# Patient Record
Sex: MALE | Race: WHITE | Employment: FULL TIME | URBAN - METROPOLITAN AREA
[De-identification: names, ages, dates, MRNs, and addresses within clinical notes are randomized per-mention and may not be internally consistent; named-entity substitution may affect disease eponyms.]

---

## 2019-12-16 ENCOUNTER — OFFICE VISIT (OUTPATIENT)
Dept: FAMILY MEDICINE CLINIC | Age: 54
End: 2019-12-16
Payer: COMMERCIAL

## 2019-12-16 VITALS
WEIGHT: 201 LBS | BODY MASS INDEX: 30.46 KG/M2 | HEIGHT: 68 IN | OXYGEN SATURATION: 97 % | SYSTOLIC BLOOD PRESSURE: 128 MMHG | HEART RATE: 85 BPM | DIASTOLIC BLOOD PRESSURE: 72 MMHG

## 2019-12-16 DIAGNOSIS — K42.9 UMBILICAL HERNIA WITHOUT OBSTRUCTION AND WITHOUT GANGRENE: ICD-10-CM

## 2019-12-16 DIAGNOSIS — Z00.00 ENCOUNTER FOR MEDICAL EXAMINATION TO ESTABLISH CARE: Primary | ICD-10-CM

## 2019-12-16 DIAGNOSIS — M62.08 DIASTASIS RECTI: ICD-10-CM

## 2019-12-16 DIAGNOSIS — Z23 NEED FOR TDAP VACCINATION: ICD-10-CM

## 2019-12-16 DIAGNOSIS — Z12.11 COLON CANCER SCREENING: ICD-10-CM

## 2019-12-16 DIAGNOSIS — Z00.00 ENCOUNTER FOR MEDICAL EXAMINATION TO ESTABLISH CARE: ICD-10-CM

## 2019-12-16 LAB
A/G RATIO: 2 (ref 1.1–2.2)
ALBUMIN SERPL-MCNC: 4.6 G/DL (ref 3.4–5)
ALP BLD-CCNC: 47 U/L (ref 40–129)
ALT SERPL-CCNC: 29 U/L (ref 10–40)
ANION GAP SERPL CALCULATED.3IONS-SCNC: 16 MMOL/L (ref 3–16)
AST SERPL-CCNC: 17 U/L (ref 15–37)
BASOPHILS ABSOLUTE: 0 K/UL (ref 0–0.2)
BASOPHILS RELATIVE PERCENT: 0.8 %
BILIRUB SERPL-MCNC: 0.5 MG/DL (ref 0–1)
BUN BLDV-MCNC: 20 MG/DL (ref 7–20)
CALCIUM SERPL-MCNC: 9.6 MG/DL (ref 8.3–10.6)
CHLORIDE BLD-SCNC: 105 MMOL/L (ref 99–110)
CHOLESTEROL, TOTAL: 257 MG/DL (ref 0–199)
CO2: 23 MMOL/L (ref 21–32)
CREAT SERPL-MCNC: 0.9 MG/DL (ref 0.9–1.3)
EOSINOPHILS ABSOLUTE: 0 K/UL (ref 0–0.6)
EOSINOPHILS RELATIVE PERCENT: 0.9 %
GFR AFRICAN AMERICAN: >60
GFR NON-AFRICAN AMERICAN: >60
GLOBULIN: 2.3 G/DL
GLUCOSE BLD-MCNC: 114 MG/DL (ref 70–99)
HCT VFR BLD CALC: 44.9 % (ref 40.5–52.5)
HDLC SERPL-MCNC: 61 MG/DL (ref 40–60)
HEMOGLOBIN: 15.5 G/DL (ref 13.5–17.5)
LDL CHOLESTEROL CALCULATED: 172 MG/DL
LYMPHOCYTES ABSOLUTE: 1.3 K/UL (ref 1–5.1)
LYMPHOCYTES RELATIVE PERCENT: 28.4 %
MCH RBC QN AUTO: 32.4 PG (ref 26–34)
MCHC RBC AUTO-ENTMCNC: 34.5 G/DL (ref 31–36)
MCV RBC AUTO: 94 FL (ref 80–100)
MONOCYTES ABSOLUTE: 0.4 K/UL (ref 0–1.3)
MONOCYTES RELATIVE PERCENT: 9.5 %
NEUTROPHILS ABSOLUTE: 2.7 K/UL (ref 1.7–7.7)
NEUTROPHILS RELATIVE PERCENT: 60.4 %
PDW BLD-RTO: 12.8 % (ref 12.4–15.4)
PLATELET # BLD: 220 K/UL (ref 135–450)
PMV BLD AUTO: 7.7 FL (ref 5–10.5)
POTASSIUM SERPL-SCNC: 4.4 MMOL/L (ref 3.5–5.1)
RBC # BLD: 4.78 M/UL (ref 4.2–5.9)
SODIUM BLD-SCNC: 144 MMOL/L (ref 136–145)
TOTAL PROTEIN: 6.9 G/DL (ref 6.4–8.2)
TRIGL SERPL-MCNC: 121 MG/DL (ref 0–150)
VLDLC SERPL CALC-MCNC: 24 MG/DL
WBC # BLD: 4.4 K/UL (ref 4–11)

## 2019-12-16 PROCEDURE — 90471 IMMUNIZATION ADMIN: CPT | Performed by: FAMILY MEDICINE

## 2019-12-16 PROCEDURE — 90715 TDAP VACCINE 7 YRS/> IM: CPT | Performed by: FAMILY MEDICINE

## 2019-12-16 PROCEDURE — 99386 PREV VISIT NEW AGE 40-64: CPT | Performed by: FAMILY MEDICINE

## 2019-12-16 SDOH — SOCIAL STABILITY: SOCIAL NETWORK
IN A TYPICAL WEEK, HOW MANY TIMES DO YOU TALK ON THE PHONE WITH FAMILY, FRIENDS, OR NEIGHBORS?: MORE THAN THREE TIMES A WEEK

## 2019-12-16 SDOH — SOCIAL STABILITY: SOCIAL NETWORK
DO YOU BELONG TO ANY CLUBS OR ORGANIZATIONS SUCH AS CHURCH GROUPS UNIONS, FRATERNAL OR ATHLETIC GROUPS, OR SCHOOL GROUPS?: NO

## 2019-12-16 SDOH — SOCIAL STABILITY: SOCIAL INSECURITY: WITHIN THE LAST YEAR, HAVE YOU BEEN AFRAID OF YOUR PARTNER OR EX-PARTNER?: NO

## 2019-12-16 SDOH — SOCIAL STABILITY: SOCIAL INSECURITY
WITHIN THE LAST YEAR, HAVE YOU BEEN KICKED, HIT, SLAPPED, OR OTHERWISE PHYSICALLY HURT BY YOUR PARTNER OR EX-PARTNER?: NO

## 2019-12-16 SDOH — HEALTH STABILITY: PHYSICAL HEALTH: ON AVERAGE, HOW MANY DAYS PER WEEK DO YOU ENGAGE IN MODERATE TO STRENUOUS EXERCISE (LIKE A BRISK WALK)?: 4 DAYS

## 2019-12-16 SDOH — SOCIAL STABILITY: SOCIAL NETWORK: HOW OFTEN DO YOU ATTEND CHURCH OR RELIGIOUS SERVICES?: NEVER

## 2019-12-16 SDOH — SOCIAL STABILITY: SOCIAL NETWORK: ARE YOU MARRIED, WIDOWED, DIVORCED, SEPARATED, NEVER MARRIED, OR LIVING WITH A PARTNER?: NEVER MARRIED

## 2019-12-16 SDOH — SOCIAL STABILITY: SOCIAL NETWORK: HOW OFTEN DO YOU ATTENT MEETINGS OF THE CLUB OR ORGANIZATION YOU BELONG TO?: NEVER

## 2019-12-16 SDOH — SOCIAL STABILITY: SOCIAL NETWORK: HOW OFTEN DO YOU GET TOGETHER WITH FRIENDS OR RELATIVES?: ONCE A WEEK

## 2019-12-16 SDOH — SOCIAL STABILITY: SOCIAL INSECURITY: WITHIN THE LAST YEAR, HAVE YOU BEEN HUMILIATED OR EMOTIONALLY ABUSED IN OTHER WAYS BY YOUR PARTNER OR EX-PARTNER?: NO

## 2019-12-16 SDOH — HEALTH STABILITY: MENTAL HEALTH
STRESS IS WHEN SOMEONE FEELS TENSE, NERVOUS, ANXIOUS, OR CAN'T SLEEP AT NIGHT BECAUSE THEIR MIND IS TROUBLED. HOW STRESSED ARE YOU?: NOT AT ALL

## 2019-12-16 SDOH — SOCIAL STABILITY: SOCIAL INSECURITY
WITHIN THE LAST YEAR, HAVE TO BEEN RAPED OR FORCED TO HAVE ANY KIND OF SEXUAL ACTIVITY BY YOUR PARTNER OR EX-PARTNER?: NO

## 2019-12-16 SDOH — HEALTH STABILITY: PHYSICAL HEALTH: ON AVERAGE, HOW MANY MINUTES DO YOU ENGAGE IN EXERCISE AT THIS LEVEL?: 60 MIN

## 2019-12-16 SDOH — HEALTH STABILITY: MENTAL HEALTH: HOW OFTEN DO YOU HAVE A DRINK CONTAINING ALCOHOL?: MONTHLY OR LESS

## 2019-12-16 SDOH — HEALTH STABILITY: MENTAL HEALTH: HOW MANY STANDARD DRINKS CONTAINING ALCOHOL DO YOU HAVE ON A TYPICAL DAY?: 5 OR 6

## 2019-12-16 ASSESSMENT — ENCOUNTER SYMPTOMS
CONSTIPATION: 0
COLOR CHANGE: 0
ABDOMINAL PAIN: 0
CHEST TIGHTNESS: 0
SORE THROAT: 0
ANAL BLEEDING: 0
SINUS PAIN: 0
DIARRHEA: 0
NAUSEA: 0
ABDOMINAL DISTENTION: 0
RECTAL PAIN: 0
PHOTOPHOBIA: 0
TROUBLE SWALLOWING: 0
SINUS PRESSURE: 0
RHINORRHEA: 0
SHORTNESS OF BREATH: 0
BACK PAIN: 0
BLOOD IN STOOL: 0
WHEEZING: 0
VOMITING: 0
COUGH: 0

## 2019-12-16 ASSESSMENT — PATIENT HEALTH QUESTIONNAIRE - PHQ9
SUM OF ALL RESPONSES TO PHQ QUESTIONS 1-9: 0
SUM OF ALL RESPONSES TO PHQ9 QUESTIONS 1 & 2: 0
SUM OF ALL RESPONSES TO PHQ QUESTIONS 1-9: 0
1. LITTLE INTEREST OR PLEASURE IN DOING THINGS: 0
2. FEELING DOWN, DEPRESSED OR HOPELESS: 0

## 2019-12-27 ENCOUNTER — OFFICE VISIT (OUTPATIENT)
Dept: SURGERY | Age: 54
End: 2019-12-27
Payer: COMMERCIAL

## 2019-12-27 ENCOUNTER — TELEPHONE (OUTPATIENT)
Dept: FAMILY MEDICINE CLINIC | Age: 54
End: 2019-12-27

## 2019-12-27 VITALS
BODY MASS INDEX: 30.46 KG/M2 | DIASTOLIC BLOOD PRESSURE: 83 MMHG | TEMPERATURE: 97.7 F | HEIGHT: 68 IN | SYSTOLIC BLOOD PRESSURE: 130 MMHG | WEIGHT: 201 LBS

## 2019-12-27 DIAGNOSIS — K42.9 UMBILICAL HERNIA WITHOUT OBSTRUCTION AND WITHOUT GANGRENE: Primary | ICD-10-CM

## 2019-12-27 PROCEDURE — 99203 OFFICE O/P NEW LOW 30 MIN: CPT | Performed by: SURGERY

## 2019-12-30 ENCOUNTER — TELEPHONE (OUTPATIENT)
Dept: SURGERY | Age: 54
End: 2019-12-30

## 2020-01-10 ENCOUNTER — OFFICE VISIT (OUTPATIENT)
Dept: FAMILY MEDICINE CLINIC | Age: 55
End: 2020-01-10
Payer: COMMERCIAL

## 2020-01-10 VITALS
OXYGEN SATURATION: 96 % | WEIGHT: 197 LBS | HEART RATE: 82 BPM | DIASTOLIC BLOOD PRESSURE: 72 MMHG | SYSTOLIC BLOOD PRESSURE: 113 MMHG | BODY MASS INDEX: 30.4 KG/M2

## 2020-01-10 DIAGNOSIS — R73.01 ELEVATED FASTING GLUCOSE: ICD-10-CM

## 2020-01-10 DIAGNOSIS — Z11.59 NEED FOR HEPATITIS C SCREENING TEST: ICD-10-CM

## 2020-01-10 DIAGNOSIS — Z11.3 ROUTINE SCREENING FOR STI (SEXUALLY TRANSMITTED INFECTION): ICD-10-CM

## 2020-01-10 LAB — HEPATITIS C ANTIBODY INTERPRETATION: NORMAL

## 2020-01-10 PROCEDURE — 99214 OFFICE O/P EST MOD 30 MIN: CPT | Performed by: FAMILY MEDICINE

## 2020-01-10 ASSESSMENT — PATIENT HEALTH QUESTIONNAIRE - PHQ9
2. FEELING DOWN, DEPRESSED OR HOPELESS: 0
SUM OF ALL RESPONSES TO PHQ QUESTIONS 1-9: 0
SUM OF ALL RESPONSES TO PHQ QUESTIONS 1-9: 0
1. LITTLE INTEREST OR PLEASURE IN DOING THINGS: 0
SUM OF ALL RESPONSES TO PHQ9 QUESTIONS 1 & 2: 0

## 2020-01-10 NOTE — PROGRESS NOTES
1 [facility]  Preoperative Examination        Deb Golden  YOB: 1965    Date of Service:  1/10/2020    Vitals:    01/10/20 1113   BP: 113/72   Site: Left Upper Arm   Position: Sitting   Pulse: 82   SpO2: 96%   Weight: 197 lb (89.4 kg)      Wt Readings from Last 2 Encounters:   01/10/20 197 lb (89.4 kg)   12/27/19 201 lb (91.2 kg)     BP Readings from Last 3 Encounters:   01/10/20 113/72   12/27/19 130/83   12/16/19 128/72        Chief Complaint   Patient presents with    Pre-op Exam     Hernia Repair 1/21 Dr. Garrett Schmitz     No Known Allergies  No outpatient medications have been marked as taking for the 1/10/20 encounter (Office Visit) with Cary Silva MD.       This patient presents to the office today for a preoperative consultation at the request of surgeon, Rachel Mosher MD, who plans on performing open umbilical hernia repair on January 21. The current problem began a few years ago, and symptoms have been unchanged with time. Conservative therapy: N/A. Planned anesthesia is  MAC. The patient has the following known anesthesia issues: none -- one prior surgery and tolerated well in the past.  Patient has a bleeding risk of : no recent abnormal bleeding, no remote history of abnormal bleeding, no use of Ca-channel blockers. He had mildly elevated fasting glucose in recent labs -- asymptomatic -- will check A1c    Recently broke-up with his girlfriend.   Requesting STI screening -- he is asymptomatic       Patient Active Problem List   Diagnosis    Diastasis recti    Umbilical hernia    Mixed hyperlipidemia       Past Medical History:   Diagnosis Date    Diastasis recti     Mixed hyperlipidemia     Umbilical hernia      Past Surgical History:   Procedure Laterality Date    INGUINAL HERNIA REPAIR Right      Family History   Problem Relation Age of Onset    Diabetes Mother     Breast Cancer Mother      Social History     Socioeconomic History    Marital status: glucose -- A1c today      Lucrecia Archuleta, [address] [facility]  1/10/2020

## 2020-01-11 LAB
ESTIMATED AVERAGE GLUCOSE: 108.3 MG/DL
HBA1C MFR BLD: 5.4 %
HIV AG/AB: NORMAL
HIV ANTIGEN: NORMAL
HIV-1 ANTIBODY: NORMAL
HIV-2 AB: NORMAL

## 2020-01-13 LAB
C. TRACHOMATIS DNA ,URINE: NEGATIVE
N. GONORRHOEAE DNA, URINE: NEGATIVE

## 2020-01-13 NOTE — PROGRESS NOTES
[facility] [employer] / [facility] [place] ([facility]) Manohar Arteaga, [address]    Acknowledgment of Informed Consent for Surgical or Medical Procedure and Sedation  I agree to allow doctor(s) IKANA CASILLAS and his/her associates or assistants, including residents and/or other qualified medical practitioner to perform the following medical treatment or procedure and to administer or direct the administration of sedation as necessary:  Procedure(s): OPEN [address]  My doctor has explained the following regarding the proposed procedure:   the explanation of the procedure   the benefits of the procedure   the potential problems that might occur during recuperation   the risks and side effects of the procedure which could include but are not limited to severe blood loss, infection, stroke or death   the benefits, risks and side effect of alternative procedures including the consequences of declining this procedure or any alternative procedures   the likelihood of achieving satisfactory results. I acknowledge no guarantee or assurance has been made to me regarding the results. I understand that during the course of this treatment/procedure, unforeseen conditions can occur which require an additional or different procedure. I agree to allow my physician or assistants to perform such extension of the original procedure as they may find necessary. I understand that sedation will often result in temporary impairment of memory and fine motor skills and that sedation can occasionally progress to a state of deep sedation or general anesthesia. I understand the risks of anesthesia for surgery include, but are not limited to, sore throat, hoarseness, injury to face, mouth, or teeth; nausea; headache; injury to blood vessels or nerves; death, brain damage, or paralysis.     I understand that if I have a Limitation of Treatment order in effect during my hospitalization, the order may or may not be

## 2020-01-16 NOTE — PROGRESS NOTES

## 2020-01-20 ENCOUNTER — ANESTHESIA EVENT (OUTPATIENT)
Dept: OPERATING ROOM | Age: 55
End: 2020-01-20
Payer: COMMERCIAL

## 2020-01-21 ENCOUNTER — HOSPITAL ENCOUNTER (OUTPATIENT)
Age: 55
Setting detail: OUTPATIENT SURGERY
Discharge: HOME | End: 2020-01-21
Attending: SURGERY | Admitting: SURGERY
Payer: COMMERCIAL

## 2020-01-21 ENCOUNTER — ANESTHESIA (OUTPATIENT)
Dept: OPERATING ROOM | Age: 55
End: 2020-01-21
Payer: COMMERCIAL

## 2020-01-21 VITALS
HEART RATE: 64 BPM | RESPIRATION RATE: 16 BRPM | TEMPERATURE: 97.1 F | DIASTOLIC BLOOD PRESSURE: 88 MMHG | SYSTOLIC BLOOD PRESSURE: 125 MMHG | BODY MASS INDEX: 29.55 KG/M2 | WEIGHT: 195 LBS | OXYGEN SATURATION: 97 % | HEIGHT: 68 IN

## 2020-01-21 VITALS — OXYGEN SATURATION: 95 % | SYSTOLIC BLOOD PRESSURE: 91 MMHG | DIASTOLIC BLOOD PRESSURE: 56 MMHG

## 2020-01-21 PROCEDURE — 7100000011 HC PHASE II RECOVERY - ADDTL 15 MIN: Performed by: SURGERY

## 2020-01-21 PROCEDURE — 6360000002 HC RX W HCPCS: Performed by: NURSE ANESTHETIST, CERTIFIED REGISTERED

## 2020-01-21 PROCEDURE — 7100000001 HC PACU RECOVERY - ADDTL 15 MIN: Performed by: SURGERY

## 2020-01-21 PROCEDURE — L0450 TLSO FLEX TRUNK/THOR PRE OTS: HCPCS | Performed by: SURGERY

## 2020-01-21 PROCEDURE — 2500000003 HC RX 250 WO HCPCS: Performed by: SURGERY

## 2020-01-21 PROCEDURE — 3700000000 HC ANESTHESIA ATTENDED CARE: Performed by: SURGERY

## 2020-01-21 PROCEDURE — 2709999900 HC NON-CHARGEABLE SUPPLY: Performed by: SURGERY

## 2020-01-21 PROCEDURE — 6370000000 HC RX 637 (ALT 250 FOR IP): Performed by: ANESTHESIOLOGY

## 2020-01-21 PROCEDURE — 3600000004 HC SURGERY LEVEL 4 BASE: Performed by: SURGERY

## 2020-01-21 PROCEDURE — 6360000002 HC RX W HCPCS: Performed by: SURGERY

## 2020-01-21 PROCEDURE — 2580000003 HC RX 258: Performed by: ANESTHESIOLOGY

## 2020-01-21 PROCEDURE — 49585 REPAIR UMBILICAL HERN,5+Y/O,REDUC: CPT | Performed by: SURGERY

## 2020-01-21 PROCEDURE — 3700000001 HC ADD 15 MINUTES (ANESTHESIA): Performed by: SURGERY

## 2020-01-21 PROCEDURE — 7100000000 HC PACU RECOVERY - FIRST 15 MIN: Performed by: SURGERY

## 2020-01-21 PROCEDURE — 3600000014 HC SURGERY LEVEL 4 ADDTL 15MIN: Performed by: SURGERY

## 2020-01-21 PROCEDURE — 7100000010 HC PHASE II RECOVERY - FIRST 15 MIN: Performed by: SURGERY

## 2020-01-21 PROCEDURE — 88302 TISSUE EXAM BY PATHOLOGIST: CPT

## 2020-01-21 PROCEDURE — 2580000003 HC RX 258: Performed by: SURGERY

## 2020-01-21 RX ORDER — MEPERIDINE HYDROCHLORIDE 25 MG/ML
12.5 INJECTION INTRAMUSCULAR; INTRAVENOUS; SUBCUTANEOUS EVERY 5 MIN PRN
Status: DISCONTINUED | OUTPATIENT
Start: 2020-01-21 | End: 2020-01-21 | Stop reason: HOSPADM

## 2020-01-21 RX ORDER — LIDOCAINE HYDROCHLORIDE 20 MG/ML
INJECTION, SOLUTION INTRAVENOUS PRN
Status: DISCONTINUED | OUTPATIENT
Start: 2020-01-21 | End: 2020-01-21 | Stop reason: SDUPTHER

## 2020-01-21 RX ORDER — PROPOFOL 10 MG/ML
INJECTION, EMULSION INTRAVENOUS CONTINUOUS PRN
Status: DISCONTINUED | OUTPATIENT
Start: 2020-01-21 | End: 2020-01-21 | Stop reason: SDUPTHER

## 2020-01-21 RX ORDER — HYDRALAZINE HYDROCHLORIDE 20 MG/ML
5 INJECTION INTRAMUSCULAR; INTRAVENOUS EVERY 10 MIN PRN
Status: DISCONTINUED | OUTPATIENT
Start: 2020-01-21 | End: 2020-01-21 | Stop reason: HOSPADM

## 2020-01-21 RX ORDER — 0.9 % SODIUM CHLORIDE 0.9 %
500 INTRAVENOUS SOLUTION INTRAVENOUS
Status: DISCONTINUED | OUTPATIENT
Start: 2020-01-21 | End: 2020-01-21 | Stop reason: HOSPADM

## 2020-01-21 RX ORDER — PROCHLORPERAZINE EDISYLATE 5 MG/ML
5 INJECTION INTRAMUSCULAR; INTRAVENOUS
Status: DISCONTINUED | OUTPATIENT
Start: 2020-01-21 | End: 2020-01-21 | Stop reason: HOSPADM

## 2020-01-21 RX ORDER — HYDROCODONE BITARTRATE AND ACETAMINOPHEN 5; 325 MG/1; MG/1
1 TABLET ORAL
Status: COMPLETED | OUTPATIENT
Start: 2020-01-21 | End: 2020-01-21

## 2020-01-21 RX ORDER — FENTANYL CITRATE 50 UG/ML
INJECTION, SOLUTION INTRAMUSCULAR; INTRAVENOUS PRN
Status: DISCONTINUED | OUTPATIENT
Start: 2020-01-21 | End: 2020-01-21 | Stop reason: SDUPTHER

## 2020-01-21 RX ORDER — SODIUM CHLORIDE, SODIUM LACTATE, POTASSIUM CHLORIDE, CALCIUM CHLORIDE 600; 310; 30; 20 MG/100ML; MG/100ML; MG/100ML; MG/100ML
INJECTION, SOLUTION INTRAVENOUS CONTINUOUS
Status: DISCONTINUED | OUTPATIENT
Start: 2020-01-21 | End: 2020-01-21 | Stop reason: HOSPADM

## 2020-01-21 RX ORDER — BUPIVACAINE HYDROCHLORIDE 5 MG/ML
INJECTION, SOLUTION EPIDURAL; INTRACAUDAL PRN
Status: DISCONTINUED | OUTPATIENT
Start: 2020-01-21 | End: 2020-01-21 | Stop reason: ALTCHOICE

## 2020-01-21 RX ORDER — MAGNESIUM HYDROXIDE 1200 MG/15ML
LIQUID ORAL CONTINUOUS PRN
Status: COMPLETED | OUTPATIENT
Start: 2020-01-21 | End: 2020-01-21

## 2020-01-21 RX ORDER — ONDANSETRON 2 MG/ML
4 INJECTION INTRAMUSCULAR; INTRAVENOUS
Status: DISCONTINUED | OUTPATIENT
Start: 2020-01-21 | End: 2020-01-21 | Stop reason: HOSPADM

## 2020-01-21 RX ORDER — DIPHENHYDRAMINE HYDROCHLORIDE 50 MG/ML
12.5 INJECTION INTRAMUSCULAR; INTRAVENOUS
Status: DISCONTINUED | OUTPATIENT
Start: 2020-01-21 | End: 2020-01-21 | Stop reason: HOSPADM

## 2020-01-21 RX ORDER — MIDAZOLAM HYDROCHLORIDE 1 MG/ML
INJECTION INTRAMUSCULAR; INTRAVENOUS PRN
Status: DISCONTINUED | OUTPATIENT
Start: 2020-01-21 | End: 2020-01-21 | Stop reason: SDUPTHER

## 2020-01-21 RX ORDER — PROPOFOL 10 MG/ML
INJECTION, EMULSION INTRAVENOUS PRN
Status: DISCONTINUED | OUTPATIENT
Start: 2020-01-21 | End: 2020-01-21 | Stop reason: SDUPTHER

## 2020-01-21 RX ADMIN — PROPOFOL 100 MG: 10 INJECTION, EMULSION INTRAVENOUS at 07:31

## 2020-01-21 RX ADMIN — LIDOCAINE HYDROCHLORIDE 100 MG: 20 INJECTION, SOLUTION INTRAVENOUS at 07:31

## 2020-01-21 RX ADMIN — PROPOFOL 20 MG: 10 INJECTION, EMULSION INTRAVENOUS at 07:49

## 2020-01-21 RX ADMIN — MIDAZOLAM HYDROCHLORIDE 2 MG: 2 INJECTION, SOLUTION INTRAMUSCULAR; INTRAVENOUS at 07:23

## 2020-01-21 RX ADMIN — SODIUM CHLORIDE, SODIUM LACTATE, POTASSIUM CHLORIDE, AND CALCIUM CHLORIDE: 600; 310; 30; 20 INJECTION, SOLUTION INTRAVENOUS at 06:51

## 2020-01-21 RX ADMIN — SODIUM CHLORIDE, SODIUM LACTATE, POTASSIUM CHLORIDE, AND CALCIUM CHLORIDE: 600; 310; 30; 20 INJECTION, SOLUTION INTRAVENOUS at 05:55

## 2020-01-21 RX ADMIN — PROPOFOL 80 MCG/KG/MIN: 10 INJECTION, EMULSION INTRAVENOUS at 07:32

## 2020-01-21 RX ADMIN — FENTANYL CITRATE 50 MCG: 50 INJECTION INTRAMUSCULAR; INTRAVENOUS at 07:31

## 2020-01-21 RX ADMIN — FENTANYL CITRATE 50 MCG: 50 INJECTION INTRAMUSCULAR; INTRAVENOUS at 07:27

## 2020-01-21 RX ADMIN — HYDROCODONE BITARTRATE AND ACETAMINOPHEN 1 TABLET: 5; 325 TABLET ORAL at 09:26

## 2020-01-21 RX ADMIN — CEFAZOLIN 2 G: 10 INJECTION, POWDER, FOR SOLUTION INTRAVENOUS at 07:28

## 2020-01-21 ASSESSMENT — PULMONARY FUNCTION TESTS
PIF_VALUE: 1
PIF_VALUE: 0
PIF_VALUE: 1
PIF_VALUE: 0
PIF_VALUE: 1
PIF_VALUE: 0
PIF_VALUE: 1
PIF_VALUE: 0
PIF_VALUE: 1
PIF_VALUE: 0
PIF_VALUE: 1

## 2020-01-21 ASSESSMENT — PAIN SCALES - GENERAL
PAINLEVEL_OUTOF10: 0
PAINLEVEL_OUTOF10: 0
PAINLEVEL_OUTOF10: 4
PAINLEVEL_OUTOF10: 0

## 2020-01-21 ASSESSMENT — PAIN DESCRIPTION - DESCRIPTORS: DESCRIPTORS: SORE

## 2020-01-21 ASSESSMENT — PAIN - FUNCTIONAL ASSESSMENT: PAIN_FUNCTIONAL_ASSESSMENT: 0-10

## 2020-01-21 ASSESSMENT — LIFESTYLE VARIABLES: SMOKING_STATUS: 0

## 2020-01-21 ASSESSMENT — PAIN DESCRIPTION - PAIN TYPE: TYPE: SURGICAL PAIN

## 2020-01-21 ASSESSMENT — PAIN DESCRIPTION - LOCATION: LOCATION: ABDOMEN

## 2020-01-21 NOTE — PROGRESS NOTES
Ambulatory Surgery/Procedure Discharge Note    Vitals:    01/21/20 0859   BP: 125/88   Pulse: 64   Resp: 16   Temp: 97.1 °F (36.2 °C)   SpO2: 97%       In: 620 [P.O.:120; I.V.:500]  Out: 300 [Urine:300]    Restroom use offered before discharge. Void 300cc's per urinal    Pain assessment:  Umbilical incision clean and dry with dermabond in place with no redness or drainage, abdominal binder in place. Pain Level: 4        Patient discharged to home/self care.  Patient discharged via wheel chair by transporter to waiting family/S.O.       1/21/2020 9:53 AM

## 2020-01-21 NOTE — PROGRESS NOTES
Pt arrived form OR, s/p OPEN UMBILICAL HERNIA REPAIR, report received form CRNA and Dr. Bettye Aguila, pt arrived awake and alert, denies pain.   Hob elevated, ice chips given

## 2020-01-21 NOTE — ANESTHESIA PRE PROCEDURE
Department of Anesthesiology  Preprocedure Note       Name:  Yolande Miller   Age:  53 y.o.  :  1965                                          MRN:  [id number]         Date:  2020      Surgeon: Felecia Lopez):  Yessica Forbes MD    Procedure: OPEN UMBILICAL HERNIA REPAIR (N/A )    Medications prior to admission:   Prior to Admission medications    Not on File       Current medications:    Current Facility-Administered Medications   Medication Dose Route Frequency Provider Last Rate Last Dose    ceFAZolin (ANCEF) 2 g in dextrose 5 % 50 mL IVPB  2 g Intravenous Once Yessica Forbes MD        lactated ringers infusion   Intravenous Continuous Vermillion Raw,  mL/hr at 20 2997         Allergies:     Allergies   Allergen Reactions    Percocet [Oxycodone-Acetaminophen] Nausea Only       Problem List:    Patient Active Problem List   Diagnosis Code    Diastasis recti I25.96    Umbilical hernia O89.7    Mixed hyperlipidemia E78.2       Past Medical History:        Diagnosis Date    Diastasis recti     Mixed hyperlipidemia     Umbilical hernia        Past Surgical History:        Procedure Laterality Date    COLONOSCOPY      INGUINAL HERNIA REPAIR Right     TONSILLECTOMY         Social History:    Social History     Tobacco Use    Smoking status: Former Smoker     Last attempt to quit: 2010     Years since quitting: 10.0    Smokeless tobacco: Never Used   Substance Use Topics    Alcohol use: Yes     Frequency: Monthly or less     Drinks per session: 5 or 6     Binge frequency: Never     Comment: occasional                                Counseling given: Not Answered      Vital Signs (Current):   Vitals:    20 0534   BP: (!) 137/95   Pulse: 78   Resp: 18   Temp: 98 °F (36.7 °C)   TempSrc: Oral   SpO2: 98%   Weight: 195 lb (88.5 kg)   Height: 5' 7.5\" (1.715 m)                                              BP Readings from Last 3 Encounters:   20 (!) 137/95   01/10/20 113/72   12/27/19 130/83       NPO Status: Time of last liquid consumption: 2100                        Time of last solid consumption: 2100                        Date of last liquid consumption: 01/20/20                        Date of last solid food consumption: 01/20/20    BMI:   Wt Readings from Last 3 Encounters:   01/21/20 195 lb (88.5 kg)   01/10/20 197 lb (89.4 kg)   12/27/19 201 lb (91.2 kg)     Body mass index is 30.09 kg/m². CBC:   Lab Results   Component Value Date    WBC 4.4 12/16/2019    RBC 4.78 12/16/2019    HGB 15.5 12/16/2019    HCT 44.9 12/16/2019    MCV 94.0 12/16/2019    RDW 12.8 12/16/2019     12/16/2019       CMP:   Lab Results   Component Value Date     12/16/2019    K 4.4 12/16/2019     12/16/2019    CO2 23 12/16/2019    BUN 20 12/16/2019    CREATININE 0.9 12/16/2019    GFRAA >60 12/16/2019    AGRATIO 2.0 12/16/2019    LABGLOM >60 12/16/2019    GLUCOSE 114 12/16/2019    PROT 6.9 12/16/2019    CALCIUM 9.6 12/16/2019    BILITOT 0.5 12/16/2019    ALKPHOS 47 12/16/2019    AST 17 12/16/2019    ALT 29 12/16/2019       POC Tests: No results for input(s): POCGLU, POCNA, POCK, POCCL, POCBUN, POCHEMO, POCHCT in the last 72 hours.     Coags: No results found for: PROTIME, INR, APTT    HCG (If Applicable): No results found for: PREGTESTUR, PREGSERUM, HCG, HCGQUANT     ABGs: No results found for: PHART, PO2ART, ZQF4VET, WCK7CJL, BEART, T5UWSNOD     Type & Screen (If Applicable):  No results found for: LABABO, [address]    Anesthesia Evaluation  Patient summary reviewed and Nursing notes reviewed no history of anesthetic complications:   Airway: Mallampati: II  TM distance: >3 FB   Neck ROM: full  Mouth opening: > = 3 FB Dental: normal exam         Pulmonary: breath sounds clear to auscultation  (+) sleep apnea (probable):      (-) not a current smoker (quit 2010)                           Cardiovascular:  Exercise tolerance: good (>4 METS),       (-) past MI    NYHA Classification:

## 2020-01-31 ENCOUNTER — OFFICE VISIT (OUTPATIENT)
Dept: SURGERY | Age: 55
End: 2020-01-31

## 2020-01-31 VITALS
SYSTOLIC BLOOD PRESSURE: 154 MMHG | TEMPERATURE: 97.8 F | BODY MASS INDEX: 30.46 KG/M2 | WEIGHT: 201 LBS | DIASTOLIC BLOOD PRESSURE: 88 MMHG | HEIGHT: 68 IN

## 2020-01-31 PROCEDURE — 99024 POSTOP FOLLOW-UP VISIT: CPT | Performed by: SURGERY

## 2020-01-31 NOTE — PROGRESS NOTES
Department of General Surgery - Adult  General Surgery Service  Resident Clinic Note      CHIEF COMPLAINT:  Post-op check    History Obtained From:  patient    HISTORY OF PRESENT ILLNESS:    The patient is a 53 y.o. male with no significant past medical history who presents for follow up without complaints. Past Medical History:   Diagnosis Date    Diastasis recti     Mixed hyperlipidemia     Umbilical hernia      Past Surgical History:   Procedure Laterality Date    COLONOSCOPY      INGUINAL HERNIA REPAIR Right     TONSILLECTOMY      UMBILICAL HERNIA REPAIR N/A 3/98/1101    OPEN UMBILICAL HERNIA REPAIR performed by Tamy Parekh MD at [address]     No current outpatient medications on file. No current facility-administered medications for this visit. Allergies   Allergen Reactions    Percocet [Oxycodone-Acetaminophen] Nausea Only     Social History     Tobacco Use    Smoking status: Former Smoker     Last attempt to quit: 2010     Years since quitting: 10.0    Smokeless tobacco: Never Used   Substance Use Topics    Alcohol use: Yes     Frequency: Monthly or less     Drinks per session: 5 or 6     Binge frequency: Never     Comment: occasional    Drug use: Not Currently     Family History   Problem Relation Age of Onset    Diabetes Mother     Breast Cancer Mother        REVIEW OF SYSTEMS:    Review of systems was negative except what was mentioned in the HPI. PHYSICAL EXAM:    There were no vitals taken for this visit.   CONSTITUTIONAL:  awake, alert, cooperative, no apparent distress, and appears stated age  LUNGS:  No increased work of breathing, good air exchange, clear to auscultation bilaterally, no crackles or wheezing  CARDIOVASCULAR:  Normal apical impulse, regular rate and rhythm, normal S1 and S2, no S3 or S4, and no murmur noted  ABDOMEN:  Incision is healing well, normal bowel sounds, soft, non-distended, non-tender, no masses palpated  SKIN:  normal skin color, texture, endy    ASSESSMENT AND PLAN:  Patient is s/p primary umbilical hernia repair. He is recovering well from surgery. His pain is minimal and he is returning to normal activities. We discussed his continued restrictions and I will see him back in the office on a prn basis.     Jonathan Ayala MD  1/31/2020 12:44 PM

## 2020-06-21 ENCOUNTER — HOSPITAL ENCOUNTER (EMERGENCY)
Age: 55
Discharge: HOME | End: 2020-06-21
Attending: EMERGENCY MEDICINE
Payer: COMMERCIAL

## 2020-06-21 VITALS
RESPIRATION RATE: 21 BRPM | DIASTOLIC BLOOD PRESSURE: 91 MMHG | OXYGEN SATURATION: 97 % | SYSTOLIC BLOOD PRESSURE: 133 MMHG | HEART RATE: 97 BPM | TEMPERATURE: 98.9 F

## 2020-06-21 LAB
AMORPHOUS: ABNORMAL /HPF
ANION GAP SERPL CALCULATED.3IONS-SCNC: 14 MMOL/L (ref 3–16)
BACTERIA: ABNORMAL /HPF
BASOPHILS ABSOLUTE: 0 K/UL (ref 0–0.2)
BASOPHILS RELATIVE PERCENT: 0.4 %
BILIRUBIN URINE: NEGATIVE
BLOOD, URINE: ABNORMAL
BUN BLDV-MCNC: 12 MG/DL (ref 7–20)
CALCIUM SERPL-MCNC: 9.6 MG/DL (ref 8.3–10.6)
CHLORIDE BLD-SCNC: 104 MMOL/L (ref 99–110)
CLARITY: ABNORMAL
CO2: 21 MMOL/L (ref 21–32)
COLOR: YELLOW
CREAT SERPL-MCNC: 0.9 MG/DL (ref 0.9–1.3)
EOSINOPHILS ABSOLUTE: 0.1 K/UL (ref 0–0.6)
EOSINOPHILS RELATIVE PERCENT: 0.6 %
GFR AFRICAN AMERICAN: >60
GFR NON-AFRICAN AMERICAN: >60
GLUCOSE BLD-MCNC: 119 MG/DL (ref 70–99)
GLUCOSE URINE: NEGATIVE MG/DL
HCT VFR BLD CALC: 42.2 % (ref 40.5–52.5)
HEMOGLOBIN: 15 G/DL (ref 13.5–17.5)
KETONES, URINE: NEGATIVE MG/DL
LEUKOCYTE ESTERASE, URINE: ABNORMAL
LYMPHOCYTES ABSOLUTE: 1.5 K/UL (ref 1–5.1)
LYMPHOCYTES RELATIVE PERCENT: 18.8 %
MCH RBC QN AUTO: 33.2 PG (ref 26–34)
MCHC RBC AUTO-ENTMCNC: 35.5 G/DL (ref 31–36)
MCV RBC AUTO: 93.5 FL (ref 80–100)
MICROSCOPIC EXAMINATION: YES
MONOCYTES ABSOLUTE: 0.8 K/UL (ref 0–1.3)
MONOCYTES RELATIVE PERCENT: 10.4 %
NEUTROPHILS ABSOLUTE: 5.5 K/UL (ref 1.7–7.7)
NEUTROPHILS RELATIVE PERCENT: 69.8 %
NITRITE, URINE: POSITIVE
PDW BLD-RTO: 12.7 % (ref 12.4–15.4)
PH UA: 7 (ref 5–8)
PLATELET # BLD: 236 K/UL (ref 135–450)
PMV BLD AUTO: 7 FL (ref 5–10.5)
POTASSIUM REFLEX MAGNESIUM: 4.2 MMOL/L (ref 3.5–5.1)
PROTEIN UA: 100 MG/DL
RBC # BLD: 4.52 M/UL (ref 4.2–5.9)
RBC UA: ABNORMAL /HPF (ref 0–4)
SODIUM BLD-SCNC: 139 MMOL/L (ref 136–145)
SPECIFIC GRAVITY UA: 1.01 (ref 1–1.03)
URINE REFLEX TO CULTURE: YES
URINE TYPE: ABNORMAL
UROBILINOGEN, URINE: 0.2 E.U./DL
WBC # BLD: 7.8 K/UL (ref 4–11)
WBC UA: >100 /HPF (ref 0–5)

## 2020-06-21 PROCEDURE — 99283 EMERGENCY DEPT VISIT LOW MDM: CPT

## 2020-06-21 PROCEDURE — 6360000002 HC RX W HCPCS: Performed by: EMERGENCY MEDICINE

## 2020-06-21 PROCEDURE — 87077 CULTURE AEROBIC IDENTIFY: CPT

## 2020-06-21 PROCEDURE — 85025 COMPLETE CBC W/AUTO DIFF WBC: CPT

## 2020-06-21 PROCEDURE — 81001 URINALYSIS AUTO W/SCOPE: CPT

## 2020-06-21 PROCEDURE — 87186 SC STD MICRODIL/AGAR DIL: CPT

## 2020-06-21 PROCEDURE — 2580000003 HC RX 258: Performed by: EMERGENCY MEDICINE

## 2020-06-21 PROCEDURE — 87086 URINE CULTURE/COLONY COUNT: CPT

## 2020-06-21 PROCEDURE — 96365 THER/PROPH/DIAG IV INF INIT: CPT

## 2020-06-21 PROCEDURE — 80048 BASIC METABOLIC PNL TOTAL CA: CPT

## 2020-06-21 RX ORDER — CEPHALEXIN 500 MG/1
500 CAPSULE ORAL 4 TIMES DAILY
Qty: 40 CAPSULE | Refills: 0 | Status: SHIPPED | OUTPATIENT
Start: 2020-06-21 | End: 2020-07-01

## 2020-06-21 RX ADMIN — CEFAZOLIN SODIUM 1 G: 1 INJECTION, POWDER, FOR SOLUTION INTRAMUSCULAR; INTRAVENOUS at 20:34

## 2020-06-21 NOTE — ED PROVIDER NOTES
[oxycodone-acetaminophen]. Physical Exam     INITIAL VITALS: BP: (!) 146/102,  , Pulse: 97, Resp: 21, SpO2: 96 %   Physical Exam    Gen: Awake, alert, NAD  Head: NC/AT  Eyes: PERRLA, EOMI, anicteric sclera  ENT: MMM, normal posterior oropharynx  Neck: Supple, full ROM  Cardio: RRR, no MRG, appears well perfused  Pulm: CTAB, nonlabored  Abd: Soft, ND, positive suprapubic fullness and some mild tenderness to palpation. Bedside ultrasound with about 1 L of urine in the bladder. : Normal exam with no TTP to testicles. Normal circumcised penis. Rectal: Enlarged prostate noted on exam. No tenderness to prostate palpation  Extremities: WWP, 2+ Radial and DP pulses b/l, normal strength to lower extremities, normal gait, normal sensation to lower extremities.  No Midline lumbar spinal TTP  Neuro: AAOx3, moving all extremities, no focal deficits  Psych: Cooperative      Diagnostic Results       RADIOLOGY:  No orders to display       LABS:   Results for orders placed or performed during the hospital encounter of 06/21/20   CBC Auto Differential   Result Value Ref Range    WBC 7.8 4.0 - 11.0 K/uL    RBC 4.52 4.20 - 5.90 M/uL    Hemoglobin 15.0 13.5 - 17.5 g/dL    Hematocrit 42.2 40.5 - 52.5 %    MCV 93.5 80.0 - 100.0 fL    MCH 33.2 26.0 - 34.0 pg    MCHC 35.5 31.0 - 36.0 g/dL    RDW 12.7 12.4 - 15.4 %    Platelets 236 479 - 144 K/uL    MPV 7.0 5.0 - 10.5 fL    Neutrophils % 69.8 %    Lymphocytes % 18.8 %    Monocytes % 10.4 %    Eosinophils % 0.6 %    Basophils % 0.4 %    Neutrophils Absolute 5.5 1.7 - 7.7 K/uL    Lymphocytes Absolute 1.5 1.0 - 5.1 K/uL    Monocytes Absolute 0.8 0.0 - 1.3 K/uL    Eosinophils Absolute 0.1 0.0 - 0.6 K/uL    Basophils Absolute 0.0 0.0 - 0.2 K/uL   Basic Metabolic Panel w/ Reflex to MG   Result Value Ref Range    Sodium 139 136 - 145 mmol/L    Potassium reflex Magnesium 4.2 3.5 - 5.1 mmol/L    Chloride 104 99 - 110 mmol/L    CO2 21 21 - 32 mmol/L    Anion Gap 14 3 - 16    Glucose 119 (H) postobstructive diuresis. 9:28 PM  [place] placed and is draining well. Towards the end some blood-tinged urine likely from [place] placement. No clots and very mild. Patient feels much better after bladder decompression. Urine is notable for infection likely from urine stasis from urinary retention likely from enlarged prostate. Temp not documented. Was 98.9F on my measurement. Patient given Ancef and will give a prescription for Keflex. Patient given leg bag and given follow-up information with urology for this week. Patient also instructed to revisit with his primary care and to have his blood pressure rechecked. No signs or symptoms of endorgan dysfunction from blood pressure. Pt given strict return precautions and follow-up instructions. Pt given the opportunity to ask questions and is in agreement with the plan. Pt is stable for discharge. Clinical Impression     1. Urinary retention    2. Urinary tract infection in male    3. Elevated blood pressure reading        Disposition     PATIENT REFERRED TO:  Laith Orellana MD  [address]    Schedule an appointment as soon as possible for a visit in 1 day  Follow up on ED visit and to discuss elevated blood pressure    Mary Ann Hassan MD  [address] [place] [address]  [phone]    Schedule an appointment as soon as possible for a visit in 1 week  Follow up on ED visit      DISCHARGE MEDICATIONS:  New Prescriptions    CEPHALEXIN (KEFLEX) 500 MG CAPSULE    Take 1 capsule by mouth 4 times daily for 10 days       DISPOSITION Decision To Discharge 06/21/2020 09:21:33 PM    Discharge    Justin Morton MD   [facility]    This chart was generated in part by using Dragon Dictation system and may contain errors related to that system including errors in grammar, punctuation, and spelling, as well as words and phrases that may be inappropriate.  When dictating, effort is

## 2020-06-22 ENCOUNTER — TELEPHONE (OUTPATIENT)
Dept: FAMILY MEDICINE CLINIC | Age: 55
End: 2020-06-22

## 2020-06-22 NOTE — ED NOTES
Standard quinn bag replaced with leg bag. Patient educated on use. Patient discharged in stable condition. Instructions and prescriptions reviewed. Given opportunity to ask questions if needed and patient verbalized understanding. All questions answered.        Miracle Mendoza RN  06/21/20 7661

## 2020-06-23 ENCOUNTER — OFFICE VISIT (OUTPATIENT)
Dept: FAMILY MEDICINE CLINIC | Age: 55
End: 2020-06-23
Payer: COMMERCIAL

## 2020-06-23 VITALS
BODY MASS INDEX: 29.66 KG/M2 | HEIGHT: 67 IN | WEIGHT: 189 LBS | OXYGEN SATURATION: 97 % | HEART RATE: 81 BPM | DIASTOLIC BLOOD PRESSURE: 70 MMHG | SYSTOLIC BLOOD PRESSURE: 118 MMHG

## 2020-06-23 LAB
ORGANISM: ABNORMAL
URINE CULTURE, ROUTINE: ABNORMAL

## 2020-06-23 PROCEDURE — 99213 OFFICE O/P EST LOW 20 MIN: CPT | Performed by: FAMILY MEDICINE

## 2020-06-23 RX ORDER — TRAMADOL HYDROCHLORIDE 50 MG/1
50 TABLET ORAL EVERY 6 HOURS PRN
Qty: 12 TABLET | Refills: 0 | Status: SHIPPED | OUTPATIENT
Start: 2020-06-23 | End: 2020-06-26

## 2020-06-23 ASSESSMENT — ENCOUNTER SYMPTOMS
VOMITING: 0
DIARRHEA: 0
CONSTIPATION: 0
NAUSEA: 0
ABDOMINAL PAIN: 1
BACK PAIN: 0
BLOOD IN STOOL: 0

## 2023-04-17 ENCOUNTER — OFFICE VISIT (OUTPATIENT)
Dept: FAMILY MEDICINE CLINIC | Age: 58
End: 2023-04-17
Payer: COMMERCIAL

## 2023-04-17 VITALS
SYSTOLIC BLOOD PRESSURE: 120 MMHG | WEIGHT: 209.2 LBS | DIASTOLIC BLOOD PRESSURE: 62 MMHG | BODY MASS INDEX: 32.83 KG/M2 | OXYGEN SATURATION: 97 % | HEIGHT: 67 IN | HEART RATE: 77 BPM | TEMPERATURE: 98 F

## 2023-04-17 DIAGNOSIS — M62.08 DIASTASIS RECTI: ICD-10-CM

## 2023-04-17 DIAGNOSIS — E66.09 CLASS 1 OBESITY DUE TO EXCESS CALORIES WITHOUT SERIOUS COMORBIDITY WITH BODY MASS INDEX (BMI) OF 32.0 TO 32.9 IN ADULT: ICD-10-CM

## 2023-04-17 DIAGNOSIS — E78.2 MIXED HYPERLIPIDEMIA: Primary | ICD-10-CM

## 2023-04-17 DIAGNOSIS — L23.9 ALLERGIC DERMATITIS: ICD-10-CM

## 2023-04-17 DIAGNOSIS — N40.1 BENIGN PROSTATIC HYPERPLASIA WITH LOWER URINARY TRACT SYMPTOMS, SYMPTOM DETAILS UNSPECIFIED: ICD-10-CM

## 2023-04-17 PROBLEM — E66.811 CLASS 1 OBESITY DUE TO EXCESS CALORIES WITHOUT SERIOUS COMORBIDITY WITH BODY MASS INDEX (BMI) OF 32.0 TO 32.9 IN ADULT: Status: ACTIVE | Noted: 2023-04-17

## 2023-04-17 PROCEDURE — 1036F TOBACCO NON-USER: CPT | Performed by: FAMILY MEDICINE

## 2023-04-17 PROCEDURE — 3017F COLORECTAL CA SCREEN DOC REV: CPT | Performed by: FAMILY MEDICINE

## 2023-04-17 PROCEDURE — G8427 DOCREV CUR MEDS BY ELIG CLIN: HCPCS | Performed by: FAMILY MEDICINE

## 2023-04-17 PROCEDURE — G8417 CALC BMI ABV UP PARAM F/U: HCPCS | Performed by: FAMILY MEDICINE

## 2023-04-17 PROCEDURE — 99214 OFFICE O/P EST MOD 30 MIN: CPT | Performed by: FAMILY MEDICINE

## 2023-04-17 RX ORDER — FINASTERIDE 5 MG/1
5 TABLET, FILM COATED ORAL DAILY
COMMUNITY
Start: 2023-03-21

## 2023-04-17 RX ORDER — TAMSULOSIN HYDROCHLORIDE 0.4 MG/1
0.4 CAPSULE ORAL NIGHTLY
COMMUNITY

## 2023-04-17 SDOH — ECONOMIC STABILITY: HOUSING INSECURITY
IN THE LAST 12 MONTHS, WAS THERE A TIME WHEN YOU DID NOT HAVE A STEADY PLACE TO SLEEP OR SLEPT IN A SHELTER (INCLUDING NOW)?: NO

## 2023-04-17 SDOH — ECONOMIC STABILITY: FOOD INSECURITY: WITHIN THE PAST 12 MONTHS, THE FOOD YOU BOUGHT JUST DIDN'T LAST AND YOU DIDN'T HAVE MONEY TO GET MORE.: NEVER TRUE

## 2023-04-17 SDOH — ECONOMIC STABILITY: INCOME INSECURITY: HOW HARD IS IT FOR YOU TO PAY FOR THE VERY BASICS LIKE FOOD, HOUSING, MEDICAL CARE, AND HEATING?: NOT HARD AT ALL

## 2023-04-17 SDOH — ECONOMIC STABILITY: FOOD INSECURITY: WITHIN THE PAST 12 MONTHS, YOU WORRIED THAT YOUR FOOD WOULD RUN OUT BEFORE YOU GOT MONEY TO BUY MORE.: NEVER TRUE

## 2023-04-17 ASSESSMENT — PATIENT HEALTH QUESTIONNAIRE - PHQ9
2. FEELING DOWN, DEPRESSED OR HOPELESS: 0
SUM OF ALL RESPONSES TO PHQ QUESTIONS 1-9: 0
SUM OF ALL RESPONSES TO PHQ9 QUESTIONS 1 & 2: 0
SUM OF ALL RESPONSES TO PHQ QUESTIONS 1-9: 0
1. LITTLE INTEREST OR PLEASURE IN DOING THINGS: 0

## 2023-04-17 NOTE — PATIENT INSTRUCTIONS
hours a week. You may want to walk or try other activities, such as running, swimming, cycling, or playing tennis or team sports. Stay at a healthy weight or lose weight by making the changes in eating and physical activity listed above. Losing just a small amount of weight, even 5 to 10 pounds, can help reduce your risk for having a heart attack or stroke. Do not smoke. Manage other health problems. These include diabetes and high blood pressure. If you think you may have a problem with alcohol or drug use, talk to your doctor. If you take medicine, take it exactly as prescribed. Call your doctor if you think you are having a problem with your medicine. Check with your doctor or pharmacist before you use any other medicines, including over-the-counter medicines. Make sure your doctor knows all of the medicines, vitamins, herbal products, and supplements you take. Taking some medicines together can cause problems. When should you call for help? Watch closely for changes in your health, and be sure to contact your doctor if:    You need help making lifestyle changes. You have questions about your medicine. Where can you learn more? Go to http://www.woods.com/ and enter I961 to learn more about \"High Cholesterol: Care Instructions. \"  Current as of: September 7, 2022               Content Version: 13.6  © 2006-2023 [employer]. Care instructions adapted under license by [facility] ([facility]). If you have questions about a medical condition or this instruction, always ask your healthcare professional. [place] [address] any warranty or liability for your use of this information.

## 2023-04-17 NOTE — PROGRESS NOTES
Low Bellamy is a 60 y.o. Male who came into the clinic today to establish care with me and for appropriate   management. Since I am seeing the patient for the first time today I did review in detail his medical history along   with the medications the patient is currently on. The patient informs me that he has BPH for which he follows up   with the urologist and is on a couple of medication for the same. The patient informs me that his symptoms are   well controlled on current dosage of the medication. Going through his chart I noticed that the patient has hyperlipidemia. The patient was unaware of it. The patient would like to have his blood work checked at this time since it has been   a while it has been checked. The patient's most recent colonoscopy was in January 2020 and it was within normal   limits. His next colonoscopy will be due in 2030. The patient informs me that he had some more rash in both of his forearms after he was not with some words. Now   it has been getting better. I advised the patient that most likely it is due to some allergic dermatitis and since it is   getting better patient does keep an eye on it. The patient verbalized understanding and agreed with the plan. The patient also had concerns about bulge in the midline of his abdomen. I advised the patient that it is consistent   with diastases recti. The patient does not have any symptoms and he will keep an eye on it. No other questions or concerns today and all the question and concerns were appropriately answered.     I reviewed and discussed below mentioned labs with the patient today:    Lab Results   Component Value Date/Time    WBC 7.8 06/21/2020 08:05 PM    RBC 4.52 06/21/2020 08:05 PM    MCV 93.5 06/21/2020 08:05 PM    MCHC 35.5 06/21/2020 08:05 PM     Lab Results   Component Value Date/Time    ANIONGAP 14 06/21/2020 08:05 PM    GLUCOSE 119 06/21/2020 08:05 PM    BUN 12 06/21/2020

## 2023-05-05 DIAGNOSIS — E78.2 MIXED HYPERLIPIDEMIA: ICD-10-CM

## 2023-05-05 LAB
ALBUMIN SERPL-MCNC: 4.5 G/DL (ref 3.4–5)
ALBUMIN/GLOB SERPL: 2.1 {RATIO} (ref 1.1–2.2)
ALP SERPL-CCNC: 49 U/L (ref 40–129)
ALT SERPL-CCNC: 38 U/L (ref 10–40)
ANION GAP SERPL CALCULATED.3IONS-SCNC: 10 MMOL/L (ref 3–16)
AST SERPL-CCNC: 23 U/L (ref 15–37)
BASOPHILS # BLD: 0 K/UL (ref 0–0.2)
BASOPHILS NFR BLD: 0.4 %
BILIRUB SERPL-MCNC: 0.5 MG/DL (ref 0–1)
BUN SERPL-MCNC: 16 MG/DL (ref 7–20)
CALCIUM SERPL-MCNC: 9.2 MG/DL (ref 8.3–10.6)
CHLORIDE SERPL-SCNC: 107 MMOL/L (ref 99–110)
CHOLEST SERPL-MCNC: 263 MG/DL (ref 0–199)
CO2 SERPL-SCNC: 25 MMOL/L (ref 21–32)
CREAT SERPL-MCNC: 0.8 MG/DL (ref 0.9–1.3)
DEPRECATED RDW RBC AUTO: 12.8 % (ref 12.4–15.4)
EOSINOPHIL # BLD: 0.1 K/UL (ref 0–0.6)
EOSINOPHIL NFR BLD: 1.2 %
GFR SERPLBLD CREATININE-BSD FMLA CKD-EPI: >60 ML/MIN/{1.73_M2}
GLUCOSE SERPL-MCNC: 115 MG/DL (ref 70–99)
HCT VFR BLD AUTO: 45.4 % (ref 40.5–52.5)
HDLC SERPL-MCNC: 57 MG/DL (ref 40–60)
HGB BLD-MCNC: 15.4 G/DL (ref 13.5–17.5)
LDLC SERPL CALC-MCNC: 190 MG/DL
LYMPHOCYTES # BLD: 1.1 K/UL (ref 1–5.1)
LYMPHOCYTES NFR BLD: 22 %
MCH RBC QN AUTO: 31.8 PG (ref 26–34)
MCHC RBC AUTO-ENTMCNC: 33.8 G/DL (ref 31–36)
MCV RBC AUTO: 94 FL (ref 80–100)
MONOCYTES # BLD: 0.5 K/UL (ref 0–1.3)
MONOCYTES NFR BLD: 9.8 %
NEUTROPHILS # BLD: 3.3 K/UL (ref 1.7–7.7)
NEUTROPHILS NFR BLD: 66.6 %
PLATELET # BLD AUTO: 245 K/UL (ref 135–450)
PMV BLD AUTO: 7.9 FL (ref 5–10.5)
POTASSIUM SERPL-SCNC: 4.5 MMOL/L (ref 3.5–5.1)
PROT SERPL-MCNC: 6.6 G/DL (ref 6.4–8.2)
RBC # BLD AUTO: 4.83 M/UL (ref 4.2–5.9)
SODIUM SERPL-SCNC: 142 MMOL/L (ref 136–145)
TRIGL SERPL-MCNC: 81 MG/DL (ref 0–150)
VLDLC SERPL CALC-MCNC: 16 MG/DL
WBC # BLD AUTO: 4.9 K/UL (ref 4–11)

## 2023-05-09 DIAGNOSIS — R73.9 BLOOD GLUCOSE ELEVATED: ICD-10-CM

## 2023-05-09 DIAGNOSIS — R73.9 BLOOD GLUCOSE ELEVATED: Primary | ICD-10-CM

## 2023-05-10 LAB
EST. AVERAGE GLUCOSE BLD GHB EST-MCNC: 111.2 MG/DL
HBA1C MFR BLD: 5.5 %

## 2025-05-07 ENCOUNTER — OFFICE VISIT (OUTPATIENT)
Dept: FAMILY MEDICINE CLINIC | Age: 60
End: 2025-05-07
Payer: COMMERCIAL

## 2025-05-07 VITALS
WEIGHT: 213.6 LBS | HEIGHT: 67 IN | HEART RATE: 62 BPM | BODY MASS INDEX: 33.53 KG/M2 | SYSTOLIC BLOOD PRESSURE: 120 MMHG | DIASTOLIC BLOOD PRESSURE: 60 MMHG | OXYGEN SATURATION: 96 %

## 2025-05-07 DIAGNOSIS — Z76.89 ENCOUNTER TO ESTABLISH CARE: Primary | ICD-10-CM

## 2025-05-07 DIAGNOSIS — Z13.1 SCREENING FOR DIABETES MELLITUS: ICD-10-CM

## 2025-05-07 DIAGNOSIS — E66.09 CLASS 1 OBESITY DUE TO EXCESS CALORIES WITHOUT SERIOUS COMORBIDITY WITH BODY MASS INDEX (BMI) OF 32.0 TO 32.9 IN ADULT: ICD-10-CM

## 2025-05-07 DIAGNOSIS — Z12.5 SCREENING FOR MALIGNANT NEOPLASM OF PROSTATE: ICD-10-CM

## 2025-05-07 DIAGNOSIS — Z13.220 SCREENING CHOLESTEROL LEVEL: ICD-10-CM

## 2025-05-07 DIAGNOSIS — Z13.0 SCREENING FOR DEFICIENCY ANEMIA: ICD-10-CM

## 2025-05-07 DIAGNOSIS — E66.811 CLASS 1 OBESITY DUE TO EXCESS CALORIES WITHOUT SERIOUS COMORBIDITY WITH BODY MASS INDEX (BMI) OF 32.0 TO 32.9 IN ADULT: ICD-10-CM

## 2025-05-07 PROCEDURE — 99214 OFFICE O/P EST MOD 30 MIN: CPT

## 2025-05-07 SDOH — ECONOMIC STABILITY: FOOD INSECURITY: WITHIN THE PAST 12 MONTHS, YOU WORRIED THAT YOUR FOOD WOULD RUN OUT BEFORE YOU GOT MONEY TO BUY MORE.: NEVER TRUE

## 2025-05-07 SDOH — ECONOMIC STABILITY: FOOD INSECURITY: WITHIN THE PAST 12 MONTHS, THE FOOD YOU BOUGHT JUST DIDN'T LAST AND YOU DIDN'T HAVE MONEY TO GET MORE.: NEVER TRUE

## 2025-05-07 ASSESSMENT — PATIENT HEALTH QUESTIONNAIRE - PHQ9
SUM OF ALL RESPONSES TO PHQ QUESTIONS 1-9: 0
1. LITTLE INTEREST OR PLEASURE IN DOING THINGS: NOT AT ALL
SUM OF ALL RESPONSES TO PHQ QUESTIONS 1-9: 0
SUM OF ALL RESPONSES TO PHQ QUESTIONS 1-9: 0
2. FEELING DOWN, DEPRESSED OR HOPELESS: NOT AT ALL
SUM OF ALL RESPONSES TO PHQ QUESTIONS 1-9: 0

## 2025-05-07 ASSESSMENT — ENCOUNTER SYMPTOMS
PHOTOPHOBIA: 0
ABDOMINAL PAIN: 0
NAUSEA: 0
ABDOMINAL DISTENTION: 0
COUGH: 0
CHEST TIGHTNESS: 0
DIARRHEA: 0
TROUBLE SWALLOWING: 0
COLOR CHANGE: 0
CONSTIPATION: 0
WHEEZING: 0
RHINORRHEA: 0
SORE THROAT: 0
BACK PAIN: 0
SHORTNESS OF BREATH: 0
VOMITING: 0

## 2025-05-07 NOTE — PATIENT INSTRUCTIONS
Upgrade your walking with Rucking: www.[employer]/pages/what-is-rucking    Health goals: Weight loss goal of 5-10% of your current body weight with 1 pound of weight loss per week; Drink at least 64 oz of water daily, sleep 7-9 hours nightly, get at least 150 minutes of cardiac exercise weekly, walk more than 7,000 steps daily, limit diet to approximately 2,000-calories a day, consume all calories within a 10-hour window daily, try not to eat within 1 to 3 hours of bedtime, or within 1 to 3 hours of waking up, limit fatty, fried, and ultra-processed foods in the diet, and limit toxins (alcohol, smoking, etc).

## 2025-05-07 NOTE — PROGRESS NOTES
Jai Garcia (:  1965) is a 60 y.o. male,New patient, here for evaluation of the following chief complaint(s):  New Patient      Assessment & Plan  Encounter to establish care   -Personal medical history, surgical history, family history reviewed.  Medications reconciled.  -Care gaps addressed.  Agreeable to screenings, deferred vaccinations today.  -Age-appropriate healthcare topics discussed.  -Educated on office policies and procedures.  -Follow-up in 12 months for physical fasting blood work       Class 1 obesity due to excess calories without serious comorbidity with body mass index (BMI) of 32.0 to 32.9 in adult   Chronic, not at goal (unstable), improved with lifestyle changes recently  Health goals: Weight loss goal of 5-10% of your current body weight with 1 pound of weight loss per week; Drink at least 64 oz of water daily, sleep 7-9 hours nightly, get at least 150 minutes of cardiac exercise weekly, walk more than 7,000 steps daily, limit diet to approximately 2,000-calories a day, consume all calories within a 10-hour window daily, try not to eat within 1 to 3 hours of bedtime, or within 1 to 3 hours of waking up, limit fatty, fried, and ultra-processed foods in the diet, and limit toxins (alcohol, smoking, etc).    - Follow-up annually, sooner as needed       Screening for diabetes mellitus       Orders:    Hemoglobin A1C; Future    Comprehensive Metabolic Panel; Future    Screening cholesterol level       Orders:    Lipid Panel; Future    LIPOPROTEIN A (LPA); Future    Screening for malignant neoplasm of prostate       Orders:    PSA Screening; Future    Screening for deficiency anemia       Orders:    CBC with Auto Differential; Future      Return in about 1 year (around 2026) for Physical, Fasting Labs.    Subjective       HPI  - Presents today to establish care  - was seeing urology in the past, not for a while  - bladder has been fine since not taking anymore  - had a catheter

## 2025-05-07 NOTE — ASSESSMENT & PLAN NOTE
Chronic, not at goal (unstable), improved with lifestyle changes recently  Health goals: Weight loss goal of 5-10% of your current body weight with 1 pound of weight loss per week; Drink at least 64 oz of water daily, sleep 7-9 hours nightly, get at least 150 minutes of cardiac exercise weekly, walk more than 7,000 steps daily, limit diet to approximately 2,000-calories a day, consume all calories within a 10-hour window daily, try not to eat within 1 to 3 hours of bedtime, or within 1 to 3 hours of waking up, limit fatty, fried, and ultra-processed foods in the diet, and limit toxins (alcohol, smoking, etc).    - Follow-up annually, sooner as needed

## 2025-05-08 ENCOUNTER — RESULTS FOLLOW-UP (OUTPATIENT)
Dept: FAMILY MEDICINE CLINIC | Age: 60
End: 2025-05-08

## 2025-05-08 DIAGNOSIS — R94.4 DECREASED GFR: Primary | ICD-10-CM

## 2025-05-08 LAB
ALBUMIN SERPL-MCNC: 4.4 G/DL (ref 3.4–5)
ALBUMIN/GLOB SERPL: 2.1 {RATIO} (ref 1.1–2.2)
ALP SERPL-CCNC: 44 U/L (ref 40–129)
ALT SERPL-CCNC: 30 U/L (ref 10–40)
ANION GAP SERPL CALCULATED.3IONS-SCNC: 11 MMOL/L (ref 3–16)
AST SERPL-CCNC: 19 U/L (ref 15–37)
BASOPHILS # BLD: 0 K/UL (ref 0–0.2)
BASOPHILS NFR BLD: 0.8 %
BILIRUB SERPL-MCNC: 0.5 MG/DL (ref 0–1)
BUN SERPL-MCNC: 28 MG/DL (ref 7–20)
CALCIUM SERPL-MCNC: 9.1 MG/DL (ref 8.3–10.6)
CHLORIDE SERPL-SCNC: 110 MMOL/L (ref 99–110)
CHOLEST SERPL-MCNC: 235 MG/DL (ref 0–199)
CO2 SERPL-SCNC: 25 MMOL/L (ref 21–32)
CREAT SERPL-MCNC: 1.9 MG/DL (ref 0.8–1.3)
DEPRECATED RDW RBC AUTO: 12.5 % (ref 12.4–15.4)
EOSINOPHIL # BLD: 0.1 K/UL (ref 0–0.6)
EOSINOPHIL NFR BLD: 1.3 %
EST. AVERAGE GLUCOSE BLD GHB EST-MCNC: 111.2 MG/DL
GFR SERPLBLD CREATININE-BSD FMLA CKD-EPI: 40 ML/MIN/{1.73_M2}
GLUCOSE SERPL-MCNC: 99 MG/DL (ref 70–99)
HBA1C MFR BLD: 5.5 %
HCT VFR BLD AUTO: 39.4 % (ref 40.5–52.5)
HDLC SERPL-MCNC: 50 MG/DL (ref 40–60)
HGB BLD-MCNC: 13.6 G/DL (ref 13.5–17.5)
LDLC SERPL CALC-MCNC: 169 MG/DL
LYMPHOCYTES # BLD: 1.2 K/UL (ref 1–5.1)
LYMPHOCYTES NFR BLD: 22.9 %
MCH RBC QN AUTO: 32.3 PG (ref 26–34)
MCHC RBC AUTO-ENTMCNC: 34.6 G/DL (ref 31–36)
MCV RBC AUTO: 93.4 FL (ref 80–100)
MONOCYTES # BLD: 0.5 K/UL (ref 0–1.3)
MONOCYTES NFR BLD: 10 %
NEUTROPHILS # BLD: 3.3 K/UL (ref 1.7–7.7)
NEUTROPHILS NFR BLD: 65 %
PLATELET # BLD AUTO: 230 K/UL (ref 135–450)
PMV BLD AUTO: 8.7 FL (ref 5–10.5)
POTASSIUM SERPL-SCNC: 4.4 MMOL/L (ref 3.5–5.1)
PROT SERPL-MCNC: 6.5 G/DL (ref 6.4–8.2)
PSA SERPL DL<=0.01 NG/ML-MCNC: 6.48 NG/ML (ref 0–4)
RBC # BLD AUTO: 4.22 M/UL (ref 4.2–5.9)
SODIUM SERPL-SCNC: 146 MMOL/L (ref 136–145)
TRIGL SERPL-MCNC: 78 MG/DL (ref 0–150)
VLDLC SERPL CALC-MCNC: 16 MG/DL
WBC # BLD AUTO: 5.1 K/UL (ref 4–11)

## 2025-05-09 LAB — LPA SERPL-MCNC: 16 MG/DL

## 2025-05-12 DIAGNOSIS — R94.4 DECREASED GFR: ICD-10-CM

## 2025-05-12 LAB
ANION GAP SERPL CALCULATED.3IONS-SCNC: 10 MMOL/L (ref 3–16)
BUN SERPL-MCNC: 20 MG/DL (ref 7–20)
CALCIUM SERPL-MCNC: 8.8 MG/DL (ref 8.3–10.6)
CHLORIDE SERPL-SCNC: 104 MMOL/L (ref 99–110)
CO2 SERPL-SCNC: 26 MMOL/L (ref 21–32)
CREAT SERPL-MCNC: 1.8 MG/DL (ref 0.8–1.3)
GFR SERPLBLD CREATININE-BSD FMLA CKD-EPI: 42 ML/MIN/{1.73_M2}
GLUCOSE SERPL-MCNC: 108 MG/DL (ref 70–99)
POTASSIUM SERPL-SCNC: 4.5 MMOL/L (ref 3.5–5.1)
SODIUM SERPL-SCNC: 140 MMOL/L (ref 136–145)

## 2025-05-13 ENCOUNTER — RESULTS FOLLOW-UP (OUTPATIENT)
Dept: FAMILY MEDICINE CLINIC | Age: 60
End: 2025-05-13

## 2025-05-13 DIAGNOSIS — N18.32 CKD STAGE 3B, GFR 30-44 ML/MIN (HCC): Primary | ICD-10-CM

## 2025-05-15 RX ORDER — DAPAGLIFLOZIN 10 MG/1
10 TABLET, FILM COATED ORAL EVERY MORNING
Qty: 90 TABLET | Refills: 3 | Status: SHIPPED | OUTPATIENT
Start: 2025-05-15

## 2025-07-28 ENCOUNTER — OFFICE VISIT (OUTPATIENT)
Dept: FAMILY MEDICINE CLINIC | Age: 60
End: 2025-07-28
Payer: COMMERCIAL

## 2025-07-28 VITALS
DIASTOLIC BLOOD PRESSURE: 84 MMHG | OXYGEN SATURATION: 98 % | SYSTOLIC BLOOD PRESSURE: 120 MMHG | WEIGHT: 188.4 LBS | BODY MASS INDEX: 29.57 KG/M2 | HEIGHT: 67 IN | HEART RATE: 94 BPM

## 2025-07-28 DIAGNOSIS — R97.20 ELEVATED PSA: ICD-10-CM

## 2025-07-28 DIAGNOSIS — R94.4 DECREASED GFR: ICD-10-CM

## 2025-07-28 DIAGNOSIS — Z01.818 PRE-OP EXAM: Primary | ICD-10-CM

## 2025-07-28 LAB
ANION GAP SERPL CALCULATED.3IONS-SCNC: 12 MMOL/L (ref 3–16)
BASOPHILS # BLD: 0 K/UL (ref 0–0.2)
BASOPHILS NFR BLD: 0.6 %
BUN SERPL-MCNC: 18 MG/DL (ref 7–20)
CALCIUM SERPL-MCNC: 9.2 MG/DL (ref 8.3–10.6)
CHLORIDE SERPL-SCNC: 101 MMOL/L (ref 99–110)
CO2 SERPL-SCNC: 23 MMOL/L (ref 21–32)
CREAT SERPL-MCNC: 1.5 MG/DL (ref 0.8–1.3)
DEPRECATED RDW RBC AUTO: 13.9 % (ref 12.4–15.4)
EOSINOPHIL # BLD: 0.1 K/UL (ref 0–0.6)
EOSINOPHIL NFR BLD: 0.9 %
GFR SERPLBLD CREATININE-BSD FMLA CKD-EPI: 53 ML/MIN/{1.73_M2}
GLUCOSE SERPL-MCNC: 114 MG/DL (ref 70–99)
HCT VFR BLD AUTO: 32.1 % (ref 40.5–52.5)
HGB BLD-MCNC: 11.3 G/DL (ref 13.5–17.5)
LYMPHOCYTES # BLD: 1.2 K/UL (ref 1–5.1)
LYMPHOCYTES NFR BLD: 15.9 %
MCH RBC QN AUTO: 32.4 PG (ref 26–34)
MCHC RBC AUTO-ENTMCNC: 35.2 G/DL (ref 31–36)
MCV RBC AUTO: 92.2 FL (ref 80–100)
MONOCYTES # BLD: 0.9 K/UL (ref 0–1.3)
MONOCYTES NFR BLD: 11.8 %
NEUTROPHILS # BLD: 5.4 K/UL (ref 1.7–7.7)
NEUTROPHILS NFR BLD: 70.8 %
PLATELET # BLD AUTO: 245 K/UL (ref 135–450)
PMV BLD AUTO: 8.5 FL (ref 5–10.5)
POTASSIUM SERPL-SCNC: 4.4 MMOL/L (ref 3.5–5.1)
RBC # BLD AUTO: 3.48 M/UL (ref 4.2–5.9)
SODIUM SERPL-SCNC: 136 MMOL/L (ref 136–145)
WBC # BLD AUTO: 7.7 K/UL (ref 4–11)

## 2025-07-28 PROCEDURE — 99214 OFFICE O/P EST MOD 30 MIN: CPT

## 2025-07-28 PROCEDURE — 93000 ELECTROCARDIOGRAM COMPLETE: CPT

## 2025-07-28 RX ORDER — FINASTERIDE 5 MG/1
5 TABLET, FILM COATED ORAL DAILY
COMMUNITY
Start: 2025-07-15

## 2025-07-28 RX ORDER — TAMSULOSIN HYDROCHLORIDE 0.4 MG/1
0.4 CAPSULE ORAL DAILY
COMMUNITY
Start: 2025-07-15

## 2025-07-28 ASSESSMENT — PATIENT HEALTH QUESTIONNAIRE - PHQ9
1. LITTLE INTEREST OR PLEASURE IN DOING THINGS: NOT AT ALL
SUM OF ALL RESPONSES TO PHQ QUESTIONS 1-9: 0
SUM OF ALL RESPONSES TO PHQ QUESTIONS 1-9: 0
2. FEELING DOWN, DEPRESSED OR HOPELESS: NOT AT ALL
SUM OF ALL RESPONSES TO PHQ QUESTIONS 1-9: 0
SUM OF ALL RESPONSES TO PHQ QUESTIONS 1-9: 0

## 2025-07-28 ASSESSMENT — ENCOUNTER SYMPTOMS
TROUBLE SWALLOWING: 0
DIARRHEA: 0
NAUSEA: 0
PHOTOPHOBIA: 0
CONSTIPATION: 0
VOMITING: 0
ABDOMINAL DISTENTION: 0
SORE THROAT: 0
COLOR CHANGE: 0
BACK PAIN: 0
RHINORRHEA: 0
SHORTNESS OF BREATH: 0
ABDOMINAL PAIN: 0
CHEST TIGHTNESS: 0
COUGH: 0
WHEEZING: 0

## 2025-07-28 NOTE — PROGRESS NOTES
Preoperative Consultation      Jai Garcia  YOB: 1965    Date of Service:  7/28/2025    Vitals:    07/28/25 0818   BP: 120/84   Pulse: 94   SpO2: 98%   Weight: 85.5 kg (188 lb 6.4 oz)   Height: 1.702 m (5' 7\")      Wt Readings from Last 2 Encounters:   07/28/25 85.5 kg (188 lb 6.4 oz)   05/07/25 96.9 kg (213 lb 9.6 oz)     BP Readings from Last 3 Encounters:   07/28/25 120/84   05/07/25 120/60   04/17/23 120/62        Chief Complaint   Patient presents with    Pre-op Exam     Prostate biopsy, [facility]/15/25[facility]     No Known Allergies  Outpatient Medications Marked as Taking for the 7/28/25 encounter (Office Visit) with Glischinski, Luke A, APRN - CNP   Medication Sig Dispense Refill    finasteride (PROSCAR) 5 MG tablet Take 1 tablet by mouth daily      tamsulosin (FLOMAX) 0.4 MG capsule Take 1 capsule by mouth daily         This patient presents to the office today for a preoperative consultation at the request of surgeon, Dr. Pierce, who plans on performing prostate biopsy on August 15 at [facility] [place].  The current problem began 5 years ago, and symptoms have been worsening with time.  Conservative therapy: Yes: meds, catheter, which has been somewhat effective. Urinary retention.    Planned anesthesia: Topical anesthesia and MAC   Known anesthesia problems: None   Bleeding risk: No recent or remote history of abnormal bleeding  Personal or FH of DVT/PE: No    Patient objection to receiving blood products: No    Patient Active Problem List   Diagnosis    Diastasis recti    Umbilical hernia    Mixed hyperlipidemia    Benign prostatic hyperplasia with lower urinary tract symptoms    Class 1 obesity due to excess calories without serious comorbidity with body mass index (BMI) of 32.0 to 32.9 in adult       Past Medical History:   Diagnosis Date    Diastasis recti     Mixed hyperlipidemia     Umbilical hernia      Past Surgical History:   Procedure Laterality Date    COLONOSCOPY

## (undated) DEVICE — JEWISH HOSPITAL TURNOVER KIT: Brand: MEDLINE INDUSTRIES, INC.

## (undated) DEVICE — SUTURE VCRL SZ 3-0 L27IN ABSRB UD L26MM SH 1/2 CIR J416H

## (undated) DEVICE — SHEET, T, LAPAROTOMY, STERILE: Brand: MEDLINE

## (undated) DEVICE — GLOVE SURG SZ 7 L12IN FNGR THK75MIL WHT LTX POLYMER BEAD

## (undated) DEVICE — ADHESIVE SKIN CLSR 0.7ML TOP DERMBND ADV

## (undated) DEVICE — SOLUTION IV 1000ML 0.9% SOD CHL

## (undated) DEVICE — SUTURE ETHBND EXCEL SZ 0 L18IN NONABSORBABLE GRN L26MM MO-6 CX45D

## (undated) DEVICE — SURGICAL SET UP - SURE SET: Brand: MEDLINE INDUSTRIES, INC.

## (undated) DEVICE — GARMENT,MEDLINE,DVT,INT,CALF,MED, GEN2: Brand: MEDLINE

## (undated) DEVICE — COVER LT HNDL BLU PLAS

## (undated) DEVICE — ELECTROSURGICAL PENCIL ROCKER SWITCH NON COATED BLADE ELECTRODE 10 FT (3 M) CORD HOLSTER: Brand: MEGADYNE

## (undated) DEVICE — APPLICATOR PREP 26ML 0.7% IOD POVACRYLEX 74% ISO ALC ST

## (undated) DEVICE — STANDARD HYPODERMIC NEEDLE,POLYPROPYLENE HUB: Brand: MONOJECT

## (undated) DEVICE — BINDER ABD UNIV H9IN WAIST 45-62IN E SFT COT PREM 3 PNL

## (undated) DEVICE — PLATE ES AD W 9FT CRD 2

## (undated) DEVICE — SURE SET-DOUBLE BASIN-LF: Brand: MEDLINE INDUSTRIES, INC.